# Patient Record
Sex: MALE | Race: BLACK OR AFRICAN AMERICAN | NOT HISPANIC OR LATINO | Employment: UNEMPLOYED | URBAN - METROPOLITAN AREA
[De-identification: names, ages, dates, MRNs, and addresses within clinical notes are randomized per-mention and may not be internally consistent; named-entity substitution may affect disease eponyms.]

---

## 2023-06-27 LAB
ALANINE AMINOTRANSFERASE (SGPT) (U/L) IN SER/PLAS: 11 U/L (ref 10–52)
ALBUMIN (G/DL) IN SER/PLAS: 3.9 G/DL (ref 3.4–5)
ALKALINE PHOSPHATASE (U/L) IN SER/PLAS: 56 U/L (ref 33–120)
ANION GAP IN SER/PLAS: 11 MMOL/L (ref 10–20)
ASPARTATE AMINOTRANSFERASE (SGOT) (U/L) IN SER/PLAS: 17 U/L (ref 9–39)
BASOPHILS (10*3/UL) IN BLOOD BY AUTOMATED COUNT: 0.01 X10E9/L (ref 0–0.1)
BASOPHILS/100 LEUKOCYTES IN BLOOD BY AUTOMATED COUNT: 0.3 % (ref 0–2)
BILIRUBIN TOTAL (MG/DL) IN SER/PLAS: 0.4 MG/DL (ref 0–1.2)
CALCIUM (MG/DL) IN SER/PLAS: 8.3 MG/DL (ref 8.6–10.3)
CARBON DIOXIDE, TOTAL (MMOL/L) IN SER/PLAS: 27 MMOL/L (ref 21–32)
CHLORIDE (MMOL/L) IN SER/PLAS: 105 MMOL/L (ref 98–107)
CHOLESTEROL (MG/DL) IN SER/PLAS: 172 MG/DL (ref 0–199)
CHOLESTEROL IN HDL (MG/DL) IN SER/PLAS: 53.9 MG/DL
CHOLESTEROL/HDL RATIO: 3.2
CREATININE (MG/DL) IN SER/PLAS: 1.03 MG/DL (ref 0.5–1.3)
EOSINOPHILS (10*3/UL) IN BLOOD BY AUTOMATED COUNT: 0.04 X10E9/L (ref 0–0.7)
EOSINOPHILS/100 LEUKOCYTES IN BLOOD BY AUTOMATED COUNT: 1.3 % (ref 0–6)
ERYTHROCYTE DISTRIBUTION WIDTH (RATIO) BY AUTOMATED COUNT: 11.9 % (ref 11.5–14.5)
ERYTHROCYTE MEAN CORPUSCULAR HEMOGLOBIN CONCENTRATION (G/DL) BY AUTOMATED: 34.1 G/DL (ref 32–36)
ERYTHROCYTE MEAN CORPUSCULAR VOLUME (FL) BY AUTOMATED COUNT: 89 FL (ref 80–100)
ERYTHROCYTES (10*6/UL) IN BLOOD BY AUTOMATED COUNT: 4.89 X10E12/L (ref 4.5–5.9)
ESTIMATED AVERAGE GLUCOSE FOR HBA1C: 108 MG/DL
GFR MALE: >90 ML/MIN/1.73M2
GLUCOSE (MG/DL) IN SER/PLAS: 99 MG/DL (ref 74–99)
HEMATOCRIT (%) IN BLOOD BY AUTOMATED COUNT: 43.4 % (ref 41–52)
HEMOGLOBIN (G/DL) IN BLOOD: 14.8 G/DL (ref 13.5–17.5)
HEMOGLOBIN A1C/HEMOGLOBIN TOTAL IN BLOOD: 5.4 %
IMMATURE GRANULOCYTES/100 LEUKOCYTES IN BLOOD BY AUTOMATED COUNT: 0.3 % (ref 0–0.9)
LDL: 94 MG/DL (ref 0–99)
LEUKOCYTES (10*3/UL) IN BLOOD BY AUTOMATED COUNT: 3 X10E9/L (ref 4.4–11.3)
LYMPHOCYTES (10*3/UL) IN BLOOD BY AUTOMATED COUNT: 1.6 X10E9/L (ref 1.2–4.8)
LYMPHOCYTES/100 LEUKOCYTES IN BLOOD BY AUTOMATED COUNT: 52.8 % (ref 13–44)
MONOCYTES (10*3/UL) IN BLOOD BY AUTOMATED COUNT: 0.21 X10E9/L (ref 0.1–1)
MONOCYTES/100 LEUKOCYTES IN BLOOD BY AUTOMATED COUNT: 6.9 % (ref 2–10)
NEUTROPHILS (10*3/UL) IN BLOOD BY AUTOMATED COUNT: 1.16 X10E9/L (ref 1.2–7.7)
NEUTROPHILS/100 LEUKOCYTES IN BLOOD BY AUTOMATED COUNT: 38.4 % (ref 40–80)
PLATELETS (10*3/UL) IN BLOOD AUTOMATED COUNT: 199 X10E9/L (ref 150–450)
POTASSIUM (MMOL/L) IN SER/PLAS: 3.6 MMOL/L (ref 3.5–5.3)
PROTEIN TOTAL: 6.9 G/DL (ref 6.4–8.2)
SODIUM (MMOL/L) IN SER/PLAS: 139 MMOL/L (ref 136–145)
TRIGLYCERIDE (MG/DL) IN SER/PLAS: 120 MG/DL (ref 0–149)
UREA NITROGEN (MG/DL) IN SER/PLAS: 9 MG/DL (ref 6–23)
VLDL: 24 MG/DL (ref 0–40)

## 2023-08-03 LAB
AANTI: NORMAL
ABCP1: NORMAL
CCOLL: NORMAL PER TUBE
CCOUN: 3.4 X10E9/L
FCTOR: NORMAL
FCTSO: NORMAL
FSITE: NORMAL
GDESC: NORMAL
GPERC: 48 %
LCD19: 11 % OF LYMPH
LCD4: 59 % OF LYMPH
LCD8: 22 % OF LYMPH
LGPD1: NORMAL
LGPNO: NORMAL
LNK: 6 % OF LYMPH
LPERC: 39 %
MDESC: NORMAL
MPERC: 5 %
PV194: NORMAL
VIAB: NORMAL

## 2023-08-07 LAB — HBA1C MFR BLD: 5.6 % (ref 4–5.6)

## 2023-08-08 LAB
CHOLEST SERPL-MCNC: 205 MG/DL
PSA SERPL-MCNC: 0.86 NG/ML (ref 0–4)

## 2023-08-17 ENCOUNTER — HOSPITAL ENCOUNTER (OUTPATIENT)
Dept: DATA CONVERSION | Facility: HOSPITAL | Age: 43
End: 2023-08-17
Attending: SURGERY | Admitting: SURGERY

## 2023-08-17 DIAGNOSIS — Z87.19 PERSONAL HISTORY OF OTHER DISEASES OF THE DIGESTIVE SYSTEM: ICD-10-CM

## 2023-08-17 DIAGNOSIS — K52.9 NONINFECTIVE GASTROENTERITIS AND COLITIS, UNSPECIFIED: ICD-10-CM

## 2023-08-17 DIAGNOSIS — K63.5 POLYP OF COLON: ICD-10-CM

## 2023-08-22 LAB
COMPLETE PATHOLOGY REPORT: NORMAL
CONVERTED CLINICAL DIAGNOSIS-HISTORY: NORMAL
CONVERTED FINAL DIAGNOSIS: NORMAL
CONVERTED FINAL REPORT PDF LINK TO COPY AND PASTE: NORMAL
CONVERTED GROSS DESCRIPTION: NORMAL

## 2023-09-29 VITALS — HEIGHT: 68 IN | BODY MASS INDEX: 24.26 KG/M2 | WEIGHT: 160.05 LBS

## 2024-07-15 ENCOUNTER — HOSPITAL ENCOUNTER (EMERGENCY)
Facility: HOSPITAL | Age: 44
Discharge: HOME | End: 2024-07-15
Attending: EMERGENCY MEDICINE
Payer: COMMERCIAL

## 2024-07-15 ENCOUNTER — APPOINTMENT (OUTPATIENT)
Dept: RADIOLOGY | Facility: HOSPITAL | Age: 44
End: 2024-07-15
Payer: COMMERCIAL

## 2024-07-15 VITALS
HEART RATE: 72 BPM | BODY MASS INDEX: 23.49 KG/M2 | SYSTOLIC BLOOD PRESSURE: 148 MMHG | HEIGHT: 68 IN | TEMPERATURE: 98.6 F | DIASTOLIC BLOOD PRESSURE: 95 MMHG | RESPIRATION RATE: 16 BRPM | WEIGHT: 155 LBS | OXYGEN SATURATION: 100 %

## 2024-07-15 DIAGNOSIS — R20.2 RIGHT HAND PARESTHESIA: ICD-10-CM

## 2024-07-15 DIAGNOSIS — S16.1XXA CERVICAL STRAIN, ACUTE, INITIAL ENCOUNTER: Primary | ICD-10-CM

## 2024-07-15 LAB
ABO GROUP (TYPE) IN BLOOD: NORMAL
ALBUMIN SERPL BCP-MCNC: 4.4 G/DL (ref 3.4–5)
ALP SERPL-CCNC: 56 U/L (ref 33–120)
ALT SERPL W P-5'-P-CCNC: 9 U/L (ref 10–52)
ANION GAP SERPL CALC-SCNC: 8 MMOL/L (ref 10–20)
ANTIBODY SCREEN: NORMAL
AST SERPL W P-5'-P-CCNC: 19 U/L (ref 9–39)
BASOPHILS # BLD AUTO: 0.02 X10*3/UL (ref 0–0.1)
BASOPHILS NFR BLD AUTO: 0.5 %
BILIRUB SERPL-MCNC: 1.2 MG/DL (ref 0–1.2)
BUN SERPL-MCNC: 9 MG/DL (ref 6–23)
CALCIUM SERPL-MCNC: 8.5 MG/DL (ref 8.6–10.3)
CHLORIDE SERPL-SCNC: 100 MMOL/L (ref 98–107)
CO2 SERPL-SCNC: 32 MMOL/L (ref 21–32)
CREAT SERPL-MCNC: 1.01 MG/DL (ref 0.5–1.3)
EGFRCR SERPLBLD CKD-EPI 2021: >90 ML/MIN/1.73M*2
EOSINOPHIL # BLD AUTO: 0 X10*3/UL (ref 0–0.7)
EOSINOPHIL NFR BLD AUTO: 0 %
ERYTHROCYTE [DISTWIDTH] IN BLOOD BY AUTOMATED COUNT: 11.9 % (ref 11.5–14.5)
ETHANOL SERPL-MCNC: <10 MG/DL
GLUCOSE SERPL-MCNC: 88 MG/DL (ref 74–99)
HCT VFR BLD AUTO: 43.6 % (ref 41–52)
HGB BLD-MCNC: 15.2 G/DL (ref 13.5–17.5)
IMM GRANULOCYTES # BLD AUTO: 0.01 X10*3/UL (ref 0–0.7)
IMM GRANULOCYTES NFR BLD AUTO: 0.3 % (ref 0–0.9)
INR PPP: 1.1 (ref 0.9–1.1)
LACTATE SERPL-SCNC: 0.8 MMOL/L (ref 0.4–2)
LYMPHOCYTES # BLD AUTO: 1.21 X10*3/UL (ref 1.2–4.8)
LYMPHOCYTES NFR BLD AUTO: 30.4 %
MCH RBC QN AUTO: 30.3 PG (ref 26–34)
MCHC RBC AUTO-ENTMCNC: 34.9 G/DL (ref 32–36)
MCV RBC AUTO: 87 FL (ref 80–100)
MONOCYTES # BLD AUTO: 0.28 X10*3/UL (ref 0.1–1)
MONOCYTES NFR BLD AUTO: 7 %
NEUTROPHILS # BLD AUTO: 2.46 X10*3/UL (ref 1.2–7.7)
NEUTROPHILS NFR BLD AUTO: 61.8 %
NRBC BLD-RTO: 0 /100 WBCS (ref 0–0)
PLATELET # BLD AUTO: 184 X10*3/UL (ref 150–450)
POTASSIUM SERPL-SCNC: 3.4 MMOL/L (ref 3.5–5.3)
PROT SERPL-MCNC: 7.8 G/DL (ref 6.4–8.2)
PROTHROMBIN TIME: 12.9 SECONDS (ref 9.8–12.8)
RBC # BLD AUTO: 5.01 X10*6/UL (ref 4.5–5.9)
RH FACTOR (ANTIGEN D): NORMAL
SODIUM SERPL-SCNC: 137 MMOL/L (ref 136–145)
WBC # BLD AUTO: 4 X10*3/UL (ref 4.4–11.3)

## 2024-07-15 PROCEDURE — 72125 CT NECK SPINE W/O DYE: CPT

## 2024-07-15 PROCEDURE — 85610 PROTHROMBIN TIME: CPT | Performed by: EMERGENCY MEDICINE

## 2024-07-15 PROCEDURE — 72141 MRI NECK SPINE W/O DYE: CPT

## 2024-07-15 PROCEDURE — 72125 CT NECK SPINE W/O DYE: CPT | Performed by: STUDENT IN AN ORGANIZED HEALTH CARE EDUCATION/TRAINING PROGRAM

## 2024-07-15 PROCEDURE — 72141 MRI NECK SPINE W/O DYE: CPT | Performed by: RADIOLOGY

## 2024-07-15 PROCEDURE — 80053 COMPREHEN METABOLIC PANEL: CPT | Performed by: EMERGENCY MEDICINE

## 2024-07-15 PROCEDURE — 82077 ASSAY SPEC XCP UR&BREATH IA: CPT | Performed by: EMERGENCY MEDICINE

## 2024-07-15 PROCEDURE — 99285 EMERGENCY DEPT VISIT HI MDM: CPT | Mod: 25

## 2024-07-15 PROCEDURE — 70450 CT HEAD/BRAIN W/O DYE: CPT

## 2024-07-15 PROCEDURE — 85025 COMPLETE CBC W/AUTO DIFF WBC: CPT | Performed by: EMERGENCY MEDICINE

## 2024-07-15 PROCEDURE — 83605 ASSAY OF LACTIC ACID: CPT | Performed by: EMERGENCY MEDICINE

## 2024-07-15 PROCEDURE — 86901 BLOOD TYPING SEROLOGIC RH(D): CPT | Performed by: EMERGENCY MEDICINE

## 2024-07-15 PROCEDURE — 99291 CRITICAL CARE FIRST HOUR: CPT | Performed by: EMERGENCY MEDICINE

## 2024-07-15 PROCEDURE — 99285 EMERGENCY DEPT VISIT HI MDM: CPT

## 2024-07-15 PROCEDURE — 36415 COLL VENOUS BLD VENIPUNCTURE: CPT | Performed by: EMERGENCY MEDICINE

## 2024-07-15 PROCEDURE — 70450 CT HEAD/BRAIN W/O DYE: CPT | Performed by: STUDENT IN AN ORGANIZED HEALTH CARE EDUCATION/TRAINING PROGRAM

## 2024-07-15 ASSESSMENT — COLUMBIA-SUICIDE SEVERITY RATING SCALE - C-SSRS
1. IN THE PAST MONTH, HAVE YOU WISHED YOU WERE DEAD OR WISHED YOU COULD GO TO SLEEP AND NOT WAKE UP?: NO
2. HAVE YOU ACTUALLY HAD ANY THOUGHTS OF KILLING YOURSELF?: NO
6. HAVE YOU EVER DONE ANYTHING, STARTED TO DO ANYTHING, OR PREPARED TO DO ANYTHING TO END YOUR LIFE?: NO

## 2024-07-15 ASSESSMENT — PAIN SCALES - GENERAL
PAINLEVEL_OUTOF10: 0 - NO PAIN

## 2024-07-15 ASSESSMENT — LIFESTYLE VARIABLES
HAVE YOU EVER FELT YOU SHOULD CUT DOWN ON YOUR DRINKING: NO
EVER FELT BAD OR GUILTY ABOUT YOUR DRINKING: NO
TOTAL SCORE: 0
HAVE PEOPLE ANNOYED YOU BY CRITICIZING YOUR DRINKING: NO
EVER HAD A DRINK FIRST THING IN THE MORNING TO STEADY YOUR NERVES TO GET RID OF A HANGOVER: NO

## 2024-07-15 ASSESSMENT — PAIN - FUNCTIONAL ASSESSMENT
PAIN_FUNCTIONAL_ASSESSMENT: 0-10
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
## 2024-07-15 NOTE — Clinical Note
Larry Shin was seen and treated in our emergency department on 7/15/2024.  He may return to work on 07/19/2024.       If you have any questions or concerns, please don't hesitate to call.      Gay Pederson MD

## 2024-07-31 ENCOUNTER — OFFICE VISIT (OUTPATIENT)
Dept: ORTHOPEDIC SURGERY | Facility: CLINIC | Age: 44
End: 2024-07-31
Payer: COMMERCIAL

## 2024-07-31 VITALS — WEIGHT: 155 LBS | BODY MASS INDEX: 23.49 KG/M2 | HEIGHT: 68 IN

## 2024-07-31 DIAGNOSIS — M54.2 NECK PAIN: Primary | ICD-10-CM

## 2024-07-31 PROCEDURE — 99213 OFFICE O/P EST LOW 20 MIN: CPT | Performed by: ORTHOPAEDIC SURGERY

## 2024-07-31 PROCEDURE — 99203 OFFICE O/P NEW LOW 30 MIN: CPT | Performed by: ORTHOPAEDIC SURGERY

## 2024-07-31 PROCEDURE — 3008F BODY MASS INDEX DOCD: CPT | Performed by: ORTHOPAEDIC SURGERY

## 2024-07-31 ASSESSMENT — PAIN - FUNCTIONAL ASSESSMENT: PAIN_FUNCTIONAL_ASSESSMENT: 0-10
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
## 2024-10-03 ENCOUNTER — HOSPITAL ENCOUNTER (EMERGENCY)
Facility: HOSPITAL | Age: 44
Discharge: HOME | End: 2024-10-03
Attending: STUDENT IN AN ORGANIZED HEALTH CARE EDUCATION/TRAINING PROGRAM
Payer: MEDICARE

## 2024-10-03 ENCOUNTER — APPOINTMENT (OUTPATIENT)
Dept: RADIOLOGY | Facility: HOSPITAL | Age: 44
End: 2024-10-03
Payer: MEDICARE

## 2024-10-03 VITALS
DIASTOLIC BLOOD PRESSURE: 94 MMHG | RESPIRATION RATE: 18 BRPM | OXYGEN SATURATION: 99 % | WEIGHT: 165 LBS | TEMPERATURE: 97 F | HEIGHT: 68 IN | SYSTOLIC BLOOD PRESSURE: 133 MMHG | HEART RATE: 62 BPM | BODY MASS INDEX: 25.01 KG/M2

## 2024-10-03 DIAGNOSIS — M79.18 MUSCULOSKELETAL PAIN: ICD-10-CM

## 2024-10-03 DIAGNOSIS — R51.9 ACUTE NONINTRACTABLE HEADACHE, UNSPECIFIED HEADACHE TYPE: ICD-10-CM
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
## 2024-10-03 PROCEDURE — 99285 EMERGENCY DEPT VISIT HI MDM: CPT

## 2024-10-03 PROCEDURE — 70450 CT HEAD/BRAIN W/O DYE: CPT | Performed by: RADIOLOGY

## 2024-10-03 PROCEDURE — 72125 CT NECK SPINE W/O DYE: CPT | Performed by: RADIOLOGY

## 2024-10-03 PROCEDURE — 70450 CT HEAD/BRAIN W/O DYE: CPT

## 2024-10-03 PROCEDURE — 72125 CT NECK SPINE W/O DYE: CPT

## 2024-10-03 PROCEDURE — 99285 EMERGENCY DEPT VISIT HI MDM: CPT | Mod: 25

## 2024-10-03 RX ORDER — CYCLOBENZAPRINE HCL 10 MG
10 TABLET ORAL 2 TIMES DAILY PRN
Qty: 10 TABLET | Refills: 0 | Status: SHIPPED | OUTPATIENT
Start: 2024-10-03 | End: 2024-10-13

## 2024-10-03 ASSESSMENT — LIFESTYLE VARIABLES
EVER HAD A DRINK FIRST THING IN THE MORNING TO STEADY YOUR NERVES TO GET RID OF A HANGOVER: NO
HAVE PEOPLE ANNOYED YOU BY CRITICIZING YOUR DRINKING: NO
HAVE YOU EVER FELT YOU SHOULD CUT DOWN ON YOUR DRINKING: NO
TOTAL SCORE: 0
EVER FELT BAD OR GUILTY ABOUT YOUR DRINKING: NO

## 2024-10-03 ASSESSMENT — PAIN - FUNCTIONAL ASSESSMENT: PAIN_FUNCTIONAL_ASSESSMENT: 0-10

## 2024-10-03 ASSESSMENT — COLUMBIA-SUICIDE SEVERITY RATING SCALE - C-SSRS
2. HAVE YOU ACTUALLY HAD ANY THOUGHTS OF KILLING YOURSELF?: NO
6. HAVE YOU EVER DONE ANYTHING, STARTED TO DO ANYTHING, OR PREPARED TO DO ANYTHING TO END YOUR LIFE?: NO
1. IN THE PAST MONTH, HAVE YOU WISHED YOU WERE DEAD OR WISHED YOU COULD GO TO SLEEP AND NOT WAKE UP?: NO

## 2024-10-03 ASSESSMENT — PAIN DESCRIPTION - PROGRESSION: CLINICAL_PROGRESSION: NOT CHANGED

## 2024-10-03 ASSESSMENT — PAIN DESCRIPTION - LOCATION: LOCATION: HEAD

## 2024-10-03 ASSESSMENT — PAIN DESCRIPTION - PAIN TYPE: TYPE: ACUTE PAIN

## 2024-10-03 ASSESSMENT — PAIN SCALES - GENERAL: PAINLEVEL_OUTOF10: 2

## 2024-10-03 ASSESSMENT — PAIN DESCRIPTION - ORIENTATION: ORIENTATION: POSTERIOR
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
## 2025-04-13 ENCOUNTER — OFFICE VISIT (OUTPATIENT)
Dept: URGENT CARE | Age: 45
End: 2025-04-13
Payer: COMMERCIAL

## 2025-04-13 ENCOUNTER — ANCILLARY PROCEDURE (OUTPATIENT)
Dept: URGENT CARE | Age: 45
End: 2025-04-13
Payer: COMMERCIAL

## 2025-04-13 VITALS
OXYGEN SATURATION: 98 % | SYSTOLIC BLOOD PRESSURE: 125 MMHG | DIASTOLIC BLOOD PRESSURE: 83 MMHG | HEART RATE: 76 BPM | RESPIRATION RATE: 17 BRPM | TEMPERATURE: 97.5 F

## 2025-04-13 DIAGNOSIS — S46.911A STRAIN OF RIGHT SHOULDER, INITIAL ENCOUNTER: ICD-10-CM

## 2025-04-13 DIAGNOSIS — M25.511 ACUTE PAIN OF RIGHT SHOULDER: Primary | ICD-10-CM

## 2025-04-13 PROCEDURE — 73030 X-RAY EXAM OF SHOULDER: CPT | Mod: RIGHT SIDE | Performed by: NURSE PRACTITIONER

## 2025-04-13 PROCEDURE — 99203 OFFICE O/P NEW LOW 30 MIN: CPT | Performed by: NURSE PRACTITIONER

## 2025-04-13 RX ORDER — LIDOCAINE 50 MG/G
1 PATCH TOPICAL DAILY
Qty: 10 PATCH | Refills: 0 | Status: SHIPPED | OUTPATIENT
Start: 2025-04-13 | End: 2026-04-13

## 2025-04-13 RX ORDER — CYCLOBENZAPRINE HCL 10 MG
10 TABLET ORAL NIGHTLY PRN
Qty: 30 TABLET | Refills: 0 | Status: SHIPPED | OUTPATIENT
Start: 2025-04-13 | End: 2025-06-12

## 2025-04-13 RX ORDER — METHYLPREDNISOLONE 4 MG/1
TABLET ORAL
Qty: 21 TABLET | Refills: 0 | Status: SHIPPED | OUTPATIENT
Start: 2025-04-13 | End: 2025-04-19

## 2025-04-13 ASSESSMENT — ENCOUNTER SYMPTOMS: ARTHRALGIAS: 1

## 2025-04-13 NOTE — PROGRESS NOTES
Subjective   Patient ID: Larry Shin is a 44 y.o. male. They present today with a chief complaint of Shoulder Pain (Presents with right shoulder pain for three weeks. States pain radiates to cervical spine. Denies injury, numbness or tingling. ).    History of Present Illness    History provided by:  Patient   used: No    Shoulder Pain  Location:  Right shoulder blade. Patient is right arm dominant. His work involves repetitive motion of the bilateral arm. Pt denies nausea and vomiting.  Quality:  Sharp, radiating to posterior neck  Severity:  Moderate  Onset quality:  Gradual  Duration:  3 weeks  Timing:  Constant  Progression:  Worsening  Chronicity:  New  Context:  Patient denies known injury, trauma and falls  Worsened by:  Raising the right arm above the head  Ineffective treatments:  Warm compress      Past Medical History  Allergies as of 04/13/2025    (No Known Allergies)       (Not in a hospital admission)       No past medical history on file.    No past surgical history on file.     reports that he has never smoked. He has never used smokeless tobacco. He reports that he does not drink alcohol and does not use drugs.    Review of Systems  Review of Systems   Musculoskeletal:  Positive for arthralgias.                                  Objective    Vitals:    04/13/25 0842   BP: 125/83   Pulse: 76   Resp: 17   Temp: 36.4 °C (97.5 °F)   TempSrc: Oral   SpO2: 98%     No LMP for male patient.    Physical Exam  Vitals and nursing note reviewed.   Constitutional:       Appearance: Normal appearance.   HENT:      Head: Normocephalic and atraumatic.   Cardiovascular:      Rate and Rhythm: Normal rate and regular rhythm.   Pulmonary:      Effort: Pulmonary effort is normal.      Breath sounds: Normal breath sounds.   Musculoskeletal:      Right shoulder: No swelling, deformity, effusion, laceration, tenderness, bony tenderness or crepitus. Normal range of motion. Normal strength. Normal  pulse.      Comments: Right scapula without swelling, redness, ecchymosis, bleeding and discharge noted. Tenderness in the right scapula with right shoulder flexion. Equal , push and pull strength of bilateral hands. Right upper extremity neurovascularly intact.     Neurological:      Mental Status: He is alert.         Procedures    Point of Care Test & Imaging Results from this visit  No results found for this visit on 04/13/25.   Imaging  XR shoulder right 2+ views    Result Date: 4/13/2025  No sign of acute osseous or articular abnormality.     MACRO: None   Signed by: Milton Fraser 4/13/2025 9:27 AM Dictation workstation:   [id number]     Cardiology, Vascular, and Other Imaging  No other imaging results found for the past 2 days      Diagnostic study results (if any) were reviewed by GEOVANNY Vail.    Assessment/Plan   Allergies, medications, history, and pertinent labs/EKGs/Imaging reviewed by GEOVANNY Vail.     Medical Decision Making  Take all medications as instructed.  Rest, ice, elevation and compression discussed.  Take Tylenol and/or ibuprofen as needed for aches and pain.  Pain should improve in 1-2 weeks.  Referred to ortho.  If symptoms do not improve, advised to return and/or follow-up with PCP.  Call 911 or go to the nearest ER if the symptoms worsen.  Patient verbalized understanding of these instructions and left in stable condition.      Orders and Diagnoses  Diagnoses and all orders for this visit:  Acute pain of right shoulder  -     XR shoulder right 2+ views  -     Referral to Orthopedics and Sports Medicine; Future  -     methylPREDNISolone (Medrol Dospak) 4 mg tablets; Take as directed on package.  -     cyclobenzaprine (Flexeril) 10 mg tablet; Take 1 tablet (10 mg) by mouth as needed at bedtime for muscle spasms.  -     lidocaine (Lidoderm) 5 % patch; Place 1 patch over 12 hours on the skin once daily. Apply to painful area 12 hours per day, remove for 12  hours.  Strain of right shoulder, initial encounter      Medical Admin Record      Patient disposition: Home    Electronically signed by GEOVANNY Vail  9:57 AM

## 2025-04-24 ENCOUNTER — OFFICE VISIT (OUTPATIENT)
Dept: ORTHOPEDIC SURGERY | Facility: HOSPITAL | Age: 45
End: 2025-04-24
Payer: MEDICARE

## 2025-04-24 VITALS — HEIGHT: 68 IN | BODY MASS INDEX: 25.01 KG/M2 | WEIGHT: 165 LBS

## 2025-04-24 DIAGNOSIS — M75.51 BURSITIS OF RIGHT SHOULDER: ICD-10-CM

## 2025-04-24 DIAGNOSIS — M75.41 IMPINGEMENT SYNDROME OF RIGHT SHOULDER: ICD-10-CM

## 2025-04-24 DIAGNOSIS — M75.81 ROTATOR CUFF TENDINITIS, RIGHT: Primary | ICD-10-CM

## 2025-04-24 DIAGNOSIS — M25.511 ACUTE PAIN OF RIGHT SHOULDER: ICD-10-CM

## 2025-04-24 PROCEDURE — 1036F TOBACCO NON-USER: CPT | Performed by: ORTHOPAEDIC SURGERY

## 2025-04-24 PROCEDURE — 2500000004 HC RX 250 GENERAL PHARMACY W/ HCPCS (ALT 636 FOR OP/ED): Mod: JZ | Performed by: ORTHOPAEDIC SURGERY

## 2025-04-24 PROCEDURE — 3008F BODY MASS INDEX DOCD: CPT | Performed by: ORTHOPAEDIC SURGERY

## 2025-04-24 PROCEDURE — 99204 OFFICE O/P NEW MOD 45 MIN: CPT | Performed by: ORTHOPAEDIC SURGERY

## 2025-04-24 PROCEDURE — 99212 OFFICE O/P EST SF 10 MIN: CPT | Performed by: ORTHOPAEDIC SURGERY

## 2025-04-24 PROCEDURE — 20610 DRAIN/INJ JOINT/BURSA W/O US: CPT | Mod: RT | Performed by: ORTHOPAEDIC SURGERY

## 2025-04-24 RX ORDER — MELOXICAM 15 MG/1
15 TABLET ORAL DAILY
Qty: 30 TABLET | Refills: 3 | Status: SHIPPED | OUTPATIENT
Start: 2025-04-24 | End: 2026-04-24

## 2025-04-24 RX ORDER — TRIAMCINOLONE ACETONIDE 40 MG/ML
40 INJECTION, SUSPENSION INTRA-ARTICULAR; INTRAMUSCULAR
Status: COMPLETED | OUTPATIENT
Start: 2025-04-24 | End: 2025-04-24

## 2025-04-24 RX ORDER — LIDOCAINE HYDROCHLORIDE 10 MG/ML
4 INJECTION, SOLUTION INFILTRATION; PERINEURAL
Status: COMPLETED | OUTPATIENT
Start: 2025-04-24 | End: 2025-04-24

## 2025-04-24 RX ADMIN — LIDOCAINE HYDROCHLORIDE 4 ML: 10 INJECTION, SOLUTION INFILTRATION; PERINEURAL at 08:29

## 2025-04-24 RX ADMIN — TRIAMCINOLONE ACETONIDE 40 MG: 40 INJECTION, SUSPENSION INTRA-ARTICULAR; INTRAMUSCULAR at 08:29

## 2025-04-24 ASSESSMENT — ENCOUNTER SYMPTOMS
LOSS OF SENSATION IN FEET: 0
OCCASIONAL FEELINGS OF UNSTEADINESS: 0
DEPRESSION: 0

## 2025-04-24 NOTE — PROGRESS NOTES
44 y.o. male presents today for evaluation of right shoulder pain for months without injury. The patient reports gradual onset of worsening pain.  Pain is controlled. Patient reports no numbness and tingling.  Reports no previous surgeries, injections, or trauma to the area.  Reports pain worse with use, better at rest.   Pain dull ache, sharp at times.  Did take a steroid taper which did help some, done.  Continues to have some pain.  Pain worse with overhead and behind the back activities.    Review of Systems    Constitutional: see HPI, no fever, no chills, not feeling tired, no significant weight gain or weight loss.   Eyes: No vision changes  ENT: no nosebleeds.   Cardiovascular: no chest pain.   Respiratory: no shortness of breath and no cough.   Gastrointestinal: no abdominal pain, no nausea, no vomiting and no diarrhea.   Musculoskeletal: per HPI  Neurological: no headache, no gait disturbances  Psychiatric: no depression and no sleep disturbances.   Endocrine: no muscle weakness and no muscle cramps.   Hematologic/Lymphatic: no swollen glands and no tendency for easy bruising or excessive swelling.     Patient's past medical history, past surgical history, allergies, and medications have been reviewed unless otherwise noted in the chart.     Shoulder:  Inspection:  no evidence of infection, no erythema, no edema, no ecchymosis, Palpation:  compartments are soft  Skin:  intact, Vascular:  capillary refill <2 seconds distally, Sensation:  sensation intact to light touch distally; AROM- Abduction 90, elevation to 165 degrees, ER 90 degrees, IR 90 degrees and L3;  TTP at the biceps tendon,  NTTP at AC joint; NTTP on the lateral deltoid Special- painful Santillan test, Neer's Test, cross body test; painful Empty can test/Drop Arm test;  negative Yergason's/Speed's Test;   painful belly pressed and posterior liftoff     Constitutional   General appearance: Alert and in no acute distress. Well developed, well  nourished.    Eyes   External Eye, Conjunctiva and lids: Normal external exam - pupils were equal in size, round, reactive to light (PERRL) with normal accommodation and extraocular movements intact (EOMI).   Ears, Nose, Mouth, and Throat   Hearing: Normal.   Neck   Neck: No neck mass was observed. Supple.   Pulmonary   Respiratory effort: No respiratory distress.   Cardiovascular   Examination of extremities: No peripheral edema.   Psychiatric   Judgment and insight: Intact.   Orientation to person, place, and time: Alert and oriented x 3.       Mood and affect: Normal.      XR - no fractures, no dislocations, or no osseous abnormalities right shoulder    X-rays were personally reviewed by me. Radiology reports were reviewed by me as well, if available at the time.      Right shoulder rotator cuff tendinitis, bursitis, impingement syndrome  Based on the history, physical exam and imaging studies above, the patient's presentation is consistent with the above diagnosis.  I had a long discussion with the patient regarding their presentation and the treatment options.  We discussed initial nonoperative versus operative management options as well as potential further diagnostic imaging.  We again discussed her treatment options going forward along with their associated risks and benefits. After thorough discussion, the patient has elected to proceed with conservative management. All questions were answered to the patients satisfaction who seems satisfied with the plan.  They will call the office with any questions/concerns.    Discussion I discussed the diagnosis and treatment options with the patient today along with their associated risks and benefits. After thorough discussion, the patient has elected to proceed with a corticosteroid injection with Kenalog and Xylocaine, which was performed in the office today under aseptic technique and the patient tolerated the procedure well.          Ortho Injections:            Injection site right shoulder. Medication 4 cc 1% Xylocaine, 1 cc 40 mg Kenalog, Injection given under sterile conditions. No immediate complications noted. Post injection instructions given.  Physical therapy evaluation and treatment  Meloxicam 15 mg daily  Follow-up 6 to 8 weeks as needed    Patient ID: Larry Shin is a 44 y.o. male.    L Inj/Asp: R glenohumeral on 4/24/2025 8:29 AM  Indications: pain  Details: 22 G needle, posterior approach  Medications: 40 mg triamcinolone acetonide 40 mg/mL; 0.5 mL lidocaine 10 mg/mL (1 %)  Outcome: tolerated well, no immediate complications  Procedure, treatment alternatives, risks and benefits explained, specific risks discussed. Consent was given by the patient. Immediately prior to procedure a time out was called to verify the correct patient, procedure, equipment, support staff and site/side marked as required. Patient was prepped and draped in the usual sterile fashion.

## 2025-04-24 NOTE — PROGRESS NOTES
NPV referred by Urgent Care  Right Shoulder Pain   XR Done 4/13/25    Patient has pain for several months states the pain is sometimes in the neck moving to the shoulder, Patient was seen 4/13/25 at  Urgent Care [facility] prescribed states he has had some improvement.

## 2025-04-29 ENCOUNTER — APPOINTMENT (OUTPATIENT)
Dept: OCCUPATIONAL THERAPY | Facility: HOSPITAL | Age: 45
End: 2025-04-29

## 2025-05-06 ENCOUNTER — APPOINTMENT (OUTPATIENT)
Dept: PHYSICAL THERAPY | Facility: HOSPITAL | Age: 45
End: 2025-05-06

## 2025-05-16 ENCOUNTER — EVALUATION (OUTPATIENT)
Dept: PHYSICAL THERAPY | Facility: HOSPITAL | Age: 45
End: 2025-05-16
Payer: COMMERCIAL

## 2025-05-16 DIAGNOSIS — M75.51 BURSITIS OF RIGHT SHOULDER: ICD-10-CM

## 2025-05-16 DIAGNOSIS — M25.511 ACUTE PAIN OF RIGHT SHOULDER: Primary | ICD-10-CM

## 2025-05-16 PROCEDURE — 97110 THERAPEUTIC EXERCISES: CPT | Mod: GP | Performed by: PHYSICAL THERAPIST

## 2025-05-16 PROCEDURE — 97162 PT EVAL MOD COMPLEX 30 MIN: CPT | Mod: GP | Performed by: PHYSICAL THERAPIST

## 2025-05-16 ASSESSMENT — ENCOUNTER SYMPTOMS
DEPRESSION: 0
OCCASIONAL FEELINGS OF UNSTEADINESS: 0
LOSS OF SENSATION IN FEET: 0

## 2025-05-16 ASSESSMENT — PAIN SCALES - GENERAL: PAINLEVEL_OUTOF10: 3

## 2025-05-16 ASSESSMENT — PAIN - FUNCTIONAL ASSESSMENT: PAIN_FUNCTIONAL_ASSESSMENT: 0-10

## 2025-05-16 ASSESSMENT — PAIN DESCRIPTION - DESCRIPTORS: DESCRIPTORS: ACHING;SORE

## 2025-05-16 NOTE — PROGRESS NOTES
Physical Therapy    Physical Therapy Evaluation and Treatment      Patient Name: Larry Shin  MRN: [id number]  Today's Date: 5/16/2025  Visit #1  Time Entry:   Time Calculation  Start Time: 0315  Stop Time: 0410  Time Calculation (min): 55 min  PT Evaluation Time Entry  PT Evaluation (Moderate) Time Entry: 30  PT Therapeutic Procedures Time Entry  Therapeutic Exercise Time Entry: 20                   Assessment:  Acute Rt shoulder pain.  Onset mid March and no hx of trauma or surgery  Dx Right shoulder rotator cuff tendinitis, bursitis, impingement syndrome  PT Assessment  PT Assessment Results: Pain, Orthopedic restrictions, Decreased strength  Rehab Prognosis: Good     Plan:  OP PT Plan  Treatment/Interventions: Therapeutic exercises, Manual therapy, Neuromuscular re-education, Education/ Instruction  PT Plan: Skilled PT  PT Frequency: 1 time per week  Duration: 6 week  Onset Date: 03/25/25  Certification Period Start Date: 05/16/25  Certification Period End Date: 08/16/25  Rehab Potential: Good  Plan of Care Agreement: Patient    Current Problem:   1. Acute pain of right shoulder  Referral to Physical Therapy    Follow Up In Physical Therapy      2. Bursitis of right shoulder  Referral to Physical Therapy    Follow Up In Physical Therapy          Subjective    3/10 pain  shoulder Rt   Right shoulder rotator cuff tendinitis, bursitis, impingement syndrome  He works as        General:  Rt shoulder pain since Mid march. Right shoulder rotator cuff tendinitis, bursitis, impingement syndrome  Scapular strengthening instruction provided   General  Reason for Referral: Acute Rt shoulder pain  Referred By: arely  Past Medical History Relevant to Rehab: Right shoulder rotator cuff tendinitis, bursitis, impingement syndrome   no prior shoulder surgery  Precautions:  Precautions  STEADI Fall Risk Score (The score of 4 or more indicates an increased risk of falling): 0  Precautions Comment: Right  shoulder rotator cuff tendinitis, bursitis, impingement syndrome       Pain:  Pain Assessment  Pain Assessment: 0-10  0-10 (Numeric) Pain Score: 3  Pain Type: Acute pain  Pain Location: Shoulder  Pain Orientation: Right  Pain Descriptors: Aching, Sore  Pain Frequency: Constant/continuous  Pain Onset: Sudden         Objective        General Assessments:  44 y.o. male presents today for evaluation of right shoulder pain for months without injury. The patient reports gradual onset of worsening pain.  Pain is controlled. Patient reports no numbness and tingling.  Reports no previous surgeries, injections, or trauma to the area.      Pain worse with overhead and behind the back activities.    XR - no fractures, no dislocations, or no osseous abnormalities right shoulder     Right shoulder rotator cuff tendinitis, bursitis, impingement syndrome     Functional Assessments:  Works as a       Extremity/Trunk Assessments:  AROM- full symmetrical shoulder Rt   Negative TTP at the biceps tendon - better since MD visit     MMT  Rt shoulder flex 4/5  left 5/5  Scaption Rt 4/5 left 5/5  Abduction Rt 4/5  left 5/5  ER 5/5 steve  IR  5/5 steve     -TTP Rt shoulder    Outcome Measures:  Dash 23  = 27.27     Treatments:  EXERCISES       Date 5-16-25      VISIT# #1 # # #    REPS REPS REPS REPS          pulley                     Scapular strengthening                            Rows  Lat pull down      ER  IR   Next issue therabands next if tolerated and written HEP progression i                                     Exercises  - Shoulder Flexion Wall Slide with Towel  - 1 x daily - 7 x weekly - 10 reps  - Prone Scapular Slide with Shoulder Extension  - 1 x daily - 7 x weekly - 3 sets - 10 reps  - Prone Scapular Retraction Arms at Side  - 1 x daily - 7 x weekly - 3 sets - 10 reps  - Prone Scapular Retraction Y  - 1 x daily - 7 x weekly - 3 sets - 10 reps  - Prone Scapular Retraction and Row  - 1 x daily - 7 x weekly - 3 sets -  10 reps  - Standing Shoulder Flexion to 90 Degrees with Dumbbells  - 1 x daily - 7 x weekly - 3 sets - 10 reps  - Scaption with Dumbbells  - 1 x daily - 7 x weekly - 3 sets - 10 reps  - Shoulder Abduction with Dumbbells - Thumbs Up  - 1 x daily - 7 x weekly - 3 sets - 10 reps        HEP [email or web address]      ;  Shoulder strengthening / scapular ex  Access Code: [email or web address]  URL: [email or web address]    Exercises  - Shoulder Flexion Wall Slide with Towel  - 1 x daily - 7 x weekly - 10 reps  - Prone Scapular Slide with Shoulder Extension  - 1 x daily - 7 x weekly - 3 sets - 10 reps  - Prone Scapular Retraction Arms at Side  - 1 x daily - 7 x weekly - 3 sets - 10 reps  - Prone Scapular Retraction Y  - 1 x daily - 7 x weekly - 3 sets - 10 reps  - Prone Scapular Retraction and Row  - 1 x daily - 7 x weekly - 3 sets - 10 reps  - Standing Shoulder Flexion to 90 Degrees with Dumbbells  - 1 x daily - 7 x weekly - 3 sets - 10 reps  - Scaption with Dumbbells  - 1 x daily - 7 x weekly - 3 sets - 10 reps  - Shoulder Abduction with Dumbbells - Thumbs Up  - 1 x daily - 7 x weekly - 3 sets - 10 reps    EDUCATION:  Outpatient Education  Individual(s) Educated: Patient  Education Provided: Home Exercise Program, POC  Risk and Benefits Discussed with Patient/Caregiver/Other: yes  Patient Response to Education: Patient/Caregiver Verbalized Understanding of Information    Goals:  Active       PT Problem       PT Goal        Start:  05/16/25    Expected End:  06/06/25         Patient to be independent with home exercises within pain-free range to restore functional mobility     Patient to sleep undisturbed by pain through use of positioning and strategies for support of shoulder, tolerate Rt sidelying         PT Goal        Start:  05/16/25    Expected End:  06/27/25         Patient to reduce pain in shoulder by 50% or more for increased ease with IADLs    Patient to increase strength by 1/3 MMT grade or more in targeted  areas and for pt to demonstrate awareness of scapular stabilization with UE exercises and activities to improve mobility and function    Patient to have Quickdash show improvement of 4 points or more to indicate increased ease with function

## 2025-06-08 ENCOUNTER — OFFICE VISIT (OUTPATIENT)
Dept: URGENT CARE | Age: 45
End: 2025-06-08
Payer: COMMERCIAL

## 2025-06-08 VITALS
DIASTOLIC BLOOD PRESSURE: 83 MMHG | SYSTOLIC BLOOD PRESSURE: 138 MMHG | TEMPERATURE: 97.8 F | RESPIRATION RATE: 16 BRPM | OXYGEN SATURATION: 98 % | HEART RATE: 64 BPM

## 2025-06-08 DIAGNOSIS — L08.9 SKIN INFECTION: Primary | ICD-10-CM

## 2025-06-08 PROCEDURE — 99213 OFFICE O/P EST LOW 20 MIN: CPT | Performed by: NURSE PRACTITIONER

## 2025-06-08 RX ORDER — CEPHALEXIN 500 MG/1
500 CAPSULE ORAL 4 TIMES DAILY
Qty: 14 CAPSULE | Refills: 0 | Status: SHIPPED | OUTPATIENT
Start: 2025-06-08 | End: 2025-06-18

## 2025-06-08 RX ORDER — IBUPROFEN 800 MG/1
800 TABLET, FILM COATED ORAL 3 TIMES DAILY PRN
Qty: 60 TABLET | Refills: 0 | Status: SHIPPED | OUTPATIENT
Start: 2025-06-08 | End: 2025-08-07

## 2025-06-08 RX ORDER — SULFAMETHOXAZOLE AND TRIMETHOPRIM 800; 160 MG/1; MG/1
1 TABLET ORAL 2 TIMES DAILY
Qty: 14 TABLET | Refills: 0 | Status: SHIPPED | OUTPATIENT
Start: 2025-06-08

## 2025-06-08 ASSESSMENT — ENCOUNTER SYMPTOMS
ARTHRALGIAS: 1
JOINT SWELLING: 1

## 2025-06-08 NOTE — PROGRESS NOTES
Subjective   Patient ID: Larry Shin is a 44 y.o. male. They present today with a chief complaint of blister lip/abscess  (1 week).    History of Present Illness  Reports a painful bump on the right lower lip for 10 days.  Initially patient thought that it was in growing hair. however the surrounding tissue on the lip started to become swollen and tender as well.  Patient reports some purulent discharge from the bump.  Patient denies fever, dental pain, and trouble swallowing.  Over-the-counter cream did not improve the symptoms.  Cold compress improve the pain but warm compress did not      History provided by:  Patient   used: No        Past Medical History  Allergies as of 06/08/2025    (No Known Allergies)       Prescriptions Prior to Admission[1]     Medical History[2]    Surgical History[3]     reports that he has never smoked. He has never used smokeless tobacco. He reports that he does not drink alcohol and does not use drugs.    Review of Systems  Review of Systems   Musculoskeletal:  Positive for arthralgias and joint swelling.                                  Objective    Vitals:    06/08/25 1021   BP: 138/83   Pulse: 64   Resp: 16   Temp: 36.6 °C (97.8 °F)   SpO2: 98%     No LMP for male patient.    Physical Exam  Vitals and nursing note reviewed.   Constitutional:       Appearance: Normal appearance.   HENT:      Head: Normocephalic and atraumatic.      Mouth/Throat:      Lips: Pink.      Mouth: Mucous membranes are moist.      Pharynx: Oropharynx is clear. Uvula midline.      Comments: Erythematous papules noted on the lower lip.  Tender to touch surrounding tissue with redness, swelling and tenderness.  No bleeding or discharge.  Cardiovascular:      Rate and Rhythm: Normal rate and regular rhythm.   Pulmonary:      Effort: Pulmonary effort is normal.      Breath sounds: Normal breath sounds.   Neurological:      Mental Status: He is alert.         Procedures    Point of  Care Test & Imaging Results from this visit  No results found for this visit on 06/08/25.   Imaging  No results found.    Cardiology, Vascular, and Other Imaging  No other imaging results found for the past 2 days      Diagnostic study results (if any) were reviewed by GEOVANNY Vail.    Assessment/Plan   Allergies, medications, history, and pertinent labs/EKGs/Imaging reviewed by GEOVANNY Vail.     Medical Decision Making  Take the antibiotics with food.  Eat yogurt or take probiotic once a day.  Symptoms should improve in 2 to 3 days.   Take Tylenol and/or ibuprofen as needed for aches and pain and/or fever.  Return or follow-up with primary care provider if symptoms did not improve.  Call 911 or go to the nearest emergency room if symptoms became severe such as fever of 102.5 degrees Fahrenheit or 39.2 degrees Celsius, severe pain, shortness of breath, chest tightness.   Patient verbalized understanding of the instructions and left in stable condition.      Orders and Diagnoses  Diagnoses and all orders for this visit:  Skin infection  -     sulfamethoxazole-trimethoprim (Bactrim DS) 800-160 mg tablet; Take 1 tablet by mouth 2 times a day.  -     cephalexin (Keflex) 500 mg capsule; Take 1 capsule (500 mg) by mouth 4 times a day for 10 days.  -     ibuprofen 800 mg tablet; Take 1 tablet (800 mg) by mouth 3 times a day as needed for mild pain (1 - 3) (pain).      Medical Admin Record      Patient disposition: Home    Electronically signed by GEOVANNY Vail  10:59 AM           [1] (Not in a hospital admission)   [2] No past medical history on file.  [3] No past surgical history on file.

## 2025-06-09 ENCOUNTER — DOCUMENTATION (OUTPATIENT)
Dept: PHYSICAL THERAPY | Facility: CLINIC | Age: 45
End: 2025-06-09
Payer: COMMERCIAL

## 2025-06-09 ENCOUNTER — APPOINTMENT (OUTPATIENT)
Dept: PHYSICAL THERAPY | Facility: HOSPITAL | Age: 45
End: 2025-06-09
Payer: COMMERCIAL

## 2025-06-09 NOTE — PROGRESS NOTES
Physical Therapy                 Therapy Communication Note    Patient Name: Larry Shin  MRN: [id number]  Department:   Room: Room/bed info not found  Today's Date: 6/9/2025     Discipline: Physical Therapy    Missed Visit:       Missed Visit Reason:      Missed Time: Cancel    Comment: Via Mychart

## 2025-06-10 ENCOUNTER — TREATMENT (OUTPATIENT)
Dept: PHYSICAL THERAPY | Facility: HOSPITAL | Age: 45
End: 2025-06-10
Payer: COMMERCIAL

## 2025-06-10 DIAGNOSIS — M75.51 BURSITIS OF RIGHT SHOULDER: ICD-10-CM

## 2025-06-10 DIAGNOSIS — M25.511 ACUTE PAIN OF RIGHT SHOULDER: ICD-10-CM

## 2025-06-10 PROCEDURE — 97110 THERAPEUTIC EXERCISES: CPT | Mod: GP,CQ

## 2025-06-10 ASSESSMENT — PAIN SCALES - GENERAL: PAINLEVEL_OUTOF10: 0 - NO PAIN

## 2025-06-10 ASSESSMENT — PAIN - FUNCTIONAL ASSESSMENT: PAIN_FUNCTIONAL_ASSESSMENT: 0-10

## 2025-06-10 NOTE — PROGRESS NOTES
Physical Therapy    Physical Therapy Treatment    Patient Name: Larry Shin  MRN: [id number]  : 1980  Today's Date: 6/10/2025  Time Calculation  Start Time: 100  Stop Time: 149  Time Calculation (min): 49 min    PT Therapeutic Procedures Time Entry  Therapeutic Exercise Time Entry: 49          VISIT:# 2    Current Problem  Problem List Items Addressed This Visit           ICD-10-CM    Acute pain of right shoulder M25.511    Bursitis of right shoulder M75.51        Subjective   Pt reported he doesn't complete exercises during as set time during the day. It depends on his work schedule. He has been trying to utilize left arm more often when appropriate. He feels like his R shoulder is getting stronger as it is getting easier to complete tasks.      Precautions  Precautions  STEADI Fall Risk Score (The score of 4 or more indicates an increased risk of falling): 0  Precautions Comment: Right shoulder rotator cuff tendinitis, bursitis, impingement syndrome       Pain  Pain Assessment: 0-10  0-10 (Numeric) Pain Score: 0 - No pain  Pain Type: Acute pain  Pain Location: Shoulder  Pain Orientation: Right       Objective    Added pulleys and tband exercises reviewed HEP    DASH: 16 11.36%           Treatments:    EXERCISES       Date 5-16-25 6/10/25     VISIT# #1 #2 # #    REPS REPS REPS REPS          Pulley: flex, scaption, IR  3' ea                   Scapular strengthening                            Rows  Lat pull down      ER  IR   Next issue therabands next if tolerated and written HEP progression i Blue 2x10  Blue 2x10    Blue 2x10 R  Blue 2x10 R                                      Exercises  - Shoulder Flexion Wall Slide with Towel  - 1 x daily - 7 x weekly - 10 reps  - Prone Scapular Slide with Shoulder Extension  - 1 x daily - 7 x weekly - 3 sets - 10 reps  - Prone Scapular Retraction Arms at Side  - 1 x daily - 7 x weekly - 3 sets - 10 reps  - Prone Scapular Retraction Y  - 1 x daily - 7 x weekly - 3  sets - 10 reps  - Prone Scapular Retraction and Row  - 1 x daily - 7 x weekly - 3 sets - 10 reps  - Standing Shoulder Flexion to 90 Degrees with Dumbbells  - 1 x daily - 7 x weekly - 3 sets - 10 reps  - Scaption with Dumbbells  - 1 x daily - 7 x weekly - 3 sets - 10 reps  - Shoulder Abduction with Dumbbells - Thumbs Up  - 1 x daily - 7 x weekly - 3 sets - 10 reps   Added tband exercises to hep - issued green/black bands.    Reviewed HEP     HEP [email or web address]      ;  Shoulder strengthening / scapular ex  Access Code: [email or web address]  URL: [email or web address]    Exercises  - Shoulder Flexion Wall Slide with Towel  - 1 x daily - 7 x weekly - 10 reps  - Prone Scapular Slide with Shoulder Extension  - 1 x daily - 7 x weekly - 3 sets - 10 reps  - Prone Scapular Retraction Arms at Side  - 1 x daily - 7 x weekly - 3 sets - 10 reps  - Prone Scapular Retraction Y  - 1 x daily - 7 x weekly - 3 sets - 10 reps  - Prone Scapular Retraction and Row  - 1 x daily - 7 x weekly - 3 sets - 10 reps  - Standing Shoulder Flexion to 90 Degrees with Dumbbells  - 1 x daily - 7 x weekly - 3 sets - 10 reps  - Scaption with Dumbbells  - 1 x daily - 7 x weekly - 3 sets - 10 reps  - Shoulder Abduction with Dumbbells - Thumbs Up  - 1 x daily - 7 x weekly - 3 sets - 10 reps      Assessment:   Patient able to complete tband exercises without increasing symptoms, pt did feel some difficulty with ER. Pt added 1# to shoulder exercises to 90 degrees and noted it was more challenging. Pt demonstrated good technique with prone HEP. Pt felt looser at the end of the visit. 15.91% improvement on DASH. 0/10 pain at end of session.       Plan:    Pt to schedule one more visit and continue HEP independently.    Goals:  Active       PT Problem       PT Goal        Start:  05/16/25    Expected End:  06/06/25         Patient to be independent with home exercises within pain-free range to restore functional mobility     Patient to sleep undisturbed by  pain through use of positioning and strategies for support of shoulder, tolerate Rt sidelying         PT Goal        Start:  05/16/25    Expected End:  06/27/25         Patient to reduce pain in shoulder by 50% or more for increased ease with IADLs    Patient to increase strength by 1/3 MMT grade or more in targeted areas and for pt to demonstrate awareness of scapular stabilization with UE exercises and activities to improve mobility and function    Patient to have Quickdash show improvement of 4 points or more to indicate increased ease with function

## 2025-06-19 ENCOUNTER — APPOINTMENT (OUTPATIENT)
Dept: PHYSICAL THERAPY | Facility: HOSPITAL | Age: 45
End: 2025-06-19
Payer: COMMERCIAL

## 2025-06-25 ENCOUNTER — TREATMENT (OUTPATIENT)
Dept: PHYSICAL THERAPY | Facility: HOSPITAL | Age: 45
End: 2025-06-25
Payer: COMMERCIAL

## 2025-06-25 DIAGNOSIS — M75.51 BURSITIS OF RIGHT SHOULDER: ICD-10-CM

## 2025-06-25 DIAGNOSIS — M25.511 ACUTE PAIN OF RIGHT SHOULDER: ICD-10-CM

## 2025-06-25 PROCEDURE — 97110 THERAPEUTIC EXERCISES: CPT | Mod: GP | Performed by: PHYSICAL THERAPIST

## 2025-06-25 ASSESSMENT — PAIN - FUNCTIONAL ASSESSMENT: PAIN_FUNCTIONAL_ASSESSMENT: 0-10

## 2025-06-25 ASSESSMENT — PAIN SCALES - GENERAL: PAINLEVEL_OUTOF10: 0 - NO PAIN

## 2025-06-25 NOTE — PROGRESS NOTES
Physical Therapy    Physical Therapy Treatment and Discharge     Patient Name: Larry Shin  MRN: [id number]  : 1980   Today's Date: 2025  Visit # 3  Time Calculation  Start Time: 930  Stop Time:   Time Calculation (min): 40 min    PT Therapeutic Procedures Time Entry  Therapeutic Exercise Time Entry: 40  Date of Discharge 25             Auth:     Current Problem  Problem List Items Addressed This Visit           ICD-10-CM    Acute pain of right shoulder M25.511    Bursitis of right shoulder M75.51        Subjective    Name and  confirmed  0/10 pain  shoulder Rt, just stiffness that is improving   States he is not limited by the shoulder in his day or in his sleep       Dx Right shoulder rotator cuff tendinitis, bursitis, impingement syndrome          Precautions  Precautions  STEADI Fall Risk Score (The score of 4 or more indicates an increased risk of falling): 0    Pain:  Pain Assessment  Pain Assessment: 0-10  0-10 (Numeric) Pain Score: 0 - No pain  Pain Type: Acute pain  Pain Location: Shoulder  Pain Orientation: Right      Objective        Other Measures  Disability of Arm Shoulder Hand (DASH): 11General Assessments:  44 y.o. male presents with hx of  right shoulder pain for months without injury.  Reports no previous surgeries, injections, or trauma to the area.           XR - no fractures, no dislocations, or no osseous abnormalities right shoulder     Dx Right shoulder rotator cuff tendinitis, bursitis, impingement syndrome     Functional Assessments:  Works as a         Extremity/Trunk Assessments:  AROM- full symmetrical shoulder Rt   Negative TTP at the biceps tendon      MMT  Rt shoulder flex  5/5  left 5/5  Scaption Rt5/5 left 5/5  Abduction Rt 4/5  left 5/5  ER 5/5 steve  IR  5/5 steve      -TTP Rt shoulder     Outcome Measures:  Dash 11  = 0%             Treatments:         EXERCISES      recheck and DC   Date 5-16-25 6/10/25  6/25/25   VISIT# #1 #2 #3      "REPS REPS REPS             Pulley: flex, scaption, IR   3' ea  3\" ea                       Scapular strengthening      HEP                                  Rows  Lat pull down        ER  IR    Next issue therabands next if tolerated and written HEP progression i Blue 2x10  Blue 2x10     Blue 2x10 R  Blue 2x10 R     Black 10 x 2  Black 10 x 2    Blue 10 x 2 R  Blue 10 x 2 R                                                Exercises  - Shoulder Flexion Wall Slide with Towel  - 1 x daily - 7 x weekly - 10 reps  - Prone Scapular Slide with Shoulder Extension  - 1 x daily - 7 x weekly - 3 sets - 10 reps  - Prone Scapular Retraction Arms at Side  - 1 x daily - 7 x weekly - 3 sets - 10 reps  - Prone Scapular Retraction Y  - 1 x daily - 7 x weekly - 3 sets - 10 reps  - Prone Scapular Retraction and Row  - 1 x daily - 7 x weekly - 3 sets - 10 reps  - Standing Shoulder Flexion to 90 Degrees with Dumbbells  - 1 x daily - 7 x weekly - 3 sets - 10 reps  - Scaption with Dumbbells  - 1 x daily - 7 x weekly - 3 sets - 10 reps  - Shoulder Abduction with Dumbbells - Thumbs Up  - 1 x daily - 7 x weekly - 3 sets - 10 reps    Added tband exercises to hep - issued green/black bands.     Reviewed HEP  provided blue tband , he has a black and green at home too    Access Code: [email or web address]  URL: [email or web address]    Exercises  - Shoulder Flexion Wall Slide with Towel  - 1 x daily - 7 x weekly - 10 reps  - Prone Scapular Slide with Shoulder Extension  - 1 x daily - 7 x weekly - 3 sets - 10 reps  - Prone Scapular Retraction Arms at Side  - 1 x daily - 7 x weekly - 3 sets - 10 reps  - Prone Scapular Retraction Y  - 1 x daily - 7 x weekly - 3 sets - 10 reps  - Prone Scapular Retraction and Row  - 1 x daily - 7 x weekly - 3 sets - 10 reps  - Seated Scapular Retraction  - 1 x daily - 7 x weekly - 3 sets - 10 reps  - Standing Shoulder Flexion to 90 Degrees with Dumbbells  - 1 x daily - 7 x weekly - 3 sets - 10 reps  - Scaption " with Dumbbells  - 1 x daily - 7 x weekly - 3 sets - 10 reps  - Shoulder Abduction with Dumbbells - Thumbs Up  - 1 x daily - 7 x weekly - 3 sets - 10 reps  - Standing Shoulder Row with Anchored Resistance  - 1 x daily - 7 x weekly - 10 reps  - Shoulder extension with resistance - Neutral  - 1 x daily - 7 x weekly - 10 reps  - Shoulder External Rotation and Scapular Retraction with Resistance  - 1 x daily - 7 x weekly - 10 reps  - Shoulder External Rotation with Anchored Resistance  - 1 x daily - 7 x weekly - 10 reps  - Shoulder Internal Rotation with Resistance  - 1 x daily - 7 x weekly - 10 reps  - Wall Push Up  - 1 x daily - 7 x weekly - 10 reps   HEP [id number]    progressed HEP   Access Code: [email or web address]  URL: [email or web address]                Assessment: No pain , functioning without impairment in shoulder . Dash 11 = 0%.  MMT 5/5     Plan: Discharge PT   OP PT Plan  Treatment/Interventions: Therapeutic exercises, Manual therapy, Neuromuscular re-education, Education/ Instruction  PT Plan: Skilled PT  Goals:  Resolved       PT Problem       PT Goal  (Met)       Start:  05/16/25    Expected End:  06/25/25    Resolved:  06/25/25      Patient to be independent with home exercises within pain-free range to restore functional mobility . Goal Met    Patient to sleep undisturbed by pain through use of positioning and strategies for support of shoulder, tolerate Rt sidelying.  Goal Met         PT Goal  (Met)       Start:  05/16/25    Expected End:  06/27/25    Resolved:  06/25/25      Patient to reduce pain in shoulder by 50% or more for increased ease with IADLs. Goal Met    Patient to increase strength by 1/3 MMT grade or more in targeted areas and for pt to demonstrate awareness of scapular stabilization with UE exercises and activities to improve mobility and function. Goal Met    Patient to have Quickdash show improvement of 4 points or more to indicate increased ease with function  . Goal Met